# Patient Record
Sex: MALE | Race: WHITE | NOT HISPANIC OR LATINO
[De-identification: names, ages, dates, MRNs, and addresses within clinical notes are randomized per-mention and may not be internally consistent; named-entity substitution may affect disease eponyms.]

---

## 2019-02-07 VITALS
HEART RATE: 99 BPM | TEMPERATURE: 100 F | DIASTOLIC BLOOD PRESSURE: 78 MMHG | OXYGEN SATURATION: 97 % | SYSTOLIC BLOOD PRESSURE: 122 MMHG | RESPIRATION RATE: 17 BRPM

## 2019-02-07 LAB
ALBUMIN SERPL ELPH-MCNC: 3.5 G/DL — SIGNIFICANT CHANGE UP (ref 3.4–5)
ALP SERPL-CCNC: 65 U/L — SIGNIFICANT CHANGE UP (ref 40–120)
ALT FLD-CCNC: 15 U/L — SIGNIFICANT CHANGE UP (ref 12–42)
ANION GAP SERPL CALC-SCNC: 7 MMOL/L — LOW (ref 9–16)
APPEARANCE UR: CLEAR — SIGNIFICANT CHANGE UP
AST SERPL-CCNC: 17 U/L — SIGNIFICANT CHANGE UP (ref 15–37)
BASOPHILS NFR BLD AUTO: 0.2 % — SIGNIFICANT CHANGE UP (ref 0–2)
BILIRUB SERPL-MCNC: 1 MG/DL — SIGNIFICANT CHANGE UP (ref 0.2–1.2)
BILIRUB UR-MCNC: ABNORMAL
BUN SERPL-MCNC: 11 MG/DL — SIGNIFICANT CHANGE UP (ref 7–23)
CALCIUM SERPL-MCNC: 9.1 MG/DL — SIGNIFICANT CHANGE UP (ref 8.5–10.5)
CHLORIDE SERPL-SCNC: 102 MMOL/L — SIGNIFICANT CHANGE UP (ref 96–108)
CO2 SERPL-SCNC: 23 MMOL/L — SIGNIFICANT CHANGE UP (ref 22–31)
COLOR SPEC: YELLOW — SIGNIFICANT CHANGE UP
CREAT SERPL-MCNC: 1.1 MG/DL — SIGNIFICANT CHANGE UP (ref 0.5–1.3)
DIFF PNL FLD: NEGATIVE — SIGNIFICANT CHANGE UP
EOSINOPHIL NFR BLD AUTO: 0.1 % — SIGNIFICANT CHANGE UP (ref 0–6)
GLUCOSE SERPL-MCNC: 119 MG/DL — HIGH (ref 70–99)
GLUCOSE UR QL: NEGATIVE — SIGNIFICANT CHANGE UP
HCT VFR BLD CALC: 43.5 % — SIGNIFICANT CHANGE UP (ref 39–50)
HGB BLD-MCNC: 15.1 G/DL — SIGNIFICANT CHANGE UP (ref 13–17)
IMM GRANULOCYTES NFR BLD AUTO: 0.6 % — SIGNIFICANT CHANGE UP (ref 0–1.5)
KETONES UR-MCNC: >=80 MG/DL
LEUKOCYTE ESTERASE UR-ACNC: NEGATIVE — SIGNIFICANT CHANGE UP
LIDOCAIN IGE QN: 74 U/L — SIGNIFICANT CHANGE UP (ref 73–393)
LYMPHOCYTES # BLD AUTO: 7.3 % — LOW (ref 13–44)
MCHC RBC-ENTMCNC: 29.6 PG — SIGNIFICANT CHANGE UP (ref 27–34)
MCHC RBC-ENTMCNC: 34.7 G/DL — SIGNIFICANT CHANGE UP (ref 32–36)
MCV RBC AUTO: 85.3 FL — SIGNIFICANT CHANGE UP (ref 80–100)
MONOCYTES NFR BLD AUTO: 8.2 % — SIGNIFICANT CHANGE UP (ref 2–14)
NEUTROPHILS NFR BLD AUTO: 83.6 % — HIGH (ref 43–77)
NITRITE UR-MCNC: NEGATIVE — SIGNIFICANT CHANGE UP
PH UR: 6 — SIGNIFICANT CHANGE UP (ref 5–8)
PLATELET # BLD AUTO: 172 K/UL — SIGNIFICANT CHANGE UP (ref 150–400)
POTASSIUM SERPL-MCNC: 3.6 MMOL/L — SIGNIFICANT CHANGE UP (ref 3.5–5.3)
POTASSIUM SERPL-SCNC: 3.6 MMOL/L — SIGNIFICANT CHANGE UP (ref 3.5–5.3)
PROT SERPL-MCNC: 7.3 G/DL — SIGNIFICANT CHANGE UP (ref 6.4–8.2)
PROT UR-MCNC: ABNORMAL MG/DL
RBC # BLD: 5.1 M/UL — SIGNIFICANT CHANGE UP (ref 4.2–5.8)
RBC # FLD: 12.7 % — SIGNIFICANT CHANGE UP (ref 10.3–14.5)
SODIUM SERPL-SCNC: 132 MMOL/L — SIGNIFICANT CHANGE UP (ref 132–145)
SP GR SPEC: 1.02 — SIGNIFICANT CHANGE UP (ref 1–1.03)
UROBILINOGEN FLD QL: 0.2 E.U./DL — SIGNIFICANT CHANGE UP
WBC # BLD: 14.5 K/UL — HIGH (ref 3.8–10.5)
WBC # FLD AUTO: 14.5 K/UL — HIGH (ref 3.8–10.5)

## 2019-02-07 RX ORDER — ONDANSETRON 8 MG/1
4 TABLET, FILM COATED ORAL ONCE
Qty: 0 | Refills: 0 | Status: COMPLETED | OUTPATIENT
Start: 2019-02-07 | End: 2019-02-07

## 2019-02-07 RX ORDER — SODIUM CHLORIDE 9 MG/ML
1000 INJECTION INTRAMUSCULAR; INTRAVENOUS; SUBCUTANEOUS ONCE
Qty: 0 | Refills: 0 | Status: COMPLETED | OUTPATIENT
Start: 2019-02-07 | End: 2019-02-07

## 2019-02-07 RX ORDER — MORPHINE SULFATE 50 MG/1
4 CAPSULE, EXTENDED RELEASE ORAL ONCE
Qty: 0 | Refills: 0 | Status: DISCONTINUED | OUTPATIENT
Start: 2019-02-07 | End: 2019-02-07

## 2019-02-07 RX ORDER — ONDANSETRON 8 MG/1
8 TABLET, FILM COATED ORAL ONCE
Qty: 0 | Refills: 0 | Status: COMPLETED | OUTPATIENT
Start: 2019-02-07 | End: 2019-02-07

## 2019-02-07 RX ORDER — IOHEXOL 300 MG/ML
30 INJECTION, SOLUTION INTRAVENOUS ONCE
Qty: 0 | Refills: 0 | Status: COMPLETED | OUTPATIENT
Start: 2019-02-07 | End: 2019-02-07

## 2019-02-07 RX ADMIN — SODIUM CHLORIDE 2000 MILLILITER(S): 9 INJECTION INTRAMUSCULAR; INTRAVENOUS; SUBCUTANEOUS at 22:38

## 2019-02-07 RX ADMIN — IOHEXOL 30 MILLILITER(S): 300 INJECTION, SOLUTION INTRAVENOUS at 22:47

## 2019-02-07 RX ADMIN — MORPHINE SULFATE 4 MILLIGRAM(S): 50 CAPSULE, EXTENDED RELEASE ORAL at 22:47

## 2019-02-07 RX ADMIN — ONDANSETRON 4 MILLIGRAM(S): 8 TABLET, FILM COATED ORAL at 22:38

## 2019-02-07 NOTE — ED PROVIDER NOTE - OBJECTIVE STATEMENT
Patient presents with subjective fever, bodyaches and generalized abdominal pain that started this morning, pain became more intense 2 hours ago and localized to the RLQ. admits to anorexia and mild nausea. last BM today was well formed.

## 2019-02-07 NOTE — ED PROVIDER NOTE - MEDICAL DECISION MAKING DETAILS
patient with bodyaches subjective fever, generalized abdominal discomfort that started this morning associated with anorexia, with pain localizing to the RLQ x 2 hrs WBC 14K with shift, CT with acute uncomplicated appendicitis.

## 2019-02-07 NOTE — ED ADULT NURSE NOTE - OBJECTIVE STATEMENT
pt aox4. neurologically wnl. no sob or difficulty breathing noted. lung sounds clear bilaterally. skin appropriate for ethnicity, warm and dry. cap refill < 2 secs. pulses 2+ and regular. abd rounded soft and tender to palpation on RLQ. pt c/o primarily RLQ pain with nausea. denies any vomiting or diarrhea. also states he's had intermittent fever and chills.

## 2019-02-07 NOTE — ED ADULT TRIAGE NOTE - CHIEF COMPLAINT QUOTE
Pt walked into ED c.o RLQ pain since am. Pt states " I have really bad pain in my right side that radiates to my back and left side". pt rates pain as a 7/10 at this time. Pt c.o mild nausea and denes fever, chills, vomiting or diarrhea at this time.

## 2019-02-07 NOTE — ED PROVIDER NOTE - ATTENDING CONTRIBUTION TO CARE
38 y.o. male with abd pain, WBC 14K, localizing to RLQ, (+) appendicitis on CT scan, accepted for admission to [facility]/surgery.

## 2019-02-08 ENCOUNTER — RESULT REVIEW (OUTPATIENT)
Age: 39
End: 2019-02-08

## 2019-02-08 ENCOUNTER — INPATIENT (INPATIENT)
Facility: HOSPITAL | Age: 39
LOS: 0 days | Discharge: HOME | DRG: 340 | End: 2019-02-09
Attending: STUDENT IN AN ORGANIZED HEALTH CARE EDUCATION/TRAINING PROGRAM | Admitting: STUDENT IN AN ORGANIZED HEALTH CARE EDUCATION/TRAINING PROGRAM
Payer: COMMERCIAL

## 2019-02-08 ENCOUNTER — TRANSCRIPTION ENCOUNTER (OUTPATIENT)
Age: 39
End: 2019-02-08

## 2019-02-08 PROCEDURE — 99285 EMERGENCY DEPT VISIT HI MDM: CPT | Mod: 25

## 2019-02-08 PROCEDURE — 93010 ELECTROCARDIOGRAM REPORT: CPT

## 2019-02-08 PROCEDURE — 99053 MED SERV 10PM-8AM 24 HR FAC: CPT

## 2019-02-08 PROCEDURE — 74177 CT ABD & PELVIS W/CONTRAST: CPT | Mod: 26

## 2019-02-08 PROCEDURE — 99223 1ST HOSP IP/OBS HIGH 75: CPT | Mod: GC,57

## 2019-02-08 PROCEDURE — 44970 LAPAROSCOPY APPENDECTOMY: CPT | Mod: GC

## 2019-02-08 RX ORDER — HEPARIN SODIUM 5000 [USP'U]/ML
5000 INJECTION INTRAVENOUS; SUBCUTANEOUS EVERY 8 HOURS
Qty: 0 | Refills: 0 | Status: DISCONTINUED | OUTPATIENT
Start: 2019-02-08 | End: 2019-02-08

## 2019-02-08 RX ORDER — HEPARIN SODIUM 5000 [USP'U]/ML
5000 INJECTION INTRAVENOUS; SUBCUTANEOUS EVERY 8 HOURS
Qty: 0 | Refills: 0 | Status: DISCONTINUED | OUTPATIENT
Start: 2019-02-08 | End: 2019-02-09

## 2019-02-08 RX ORDER — HYDROMORPHONE HYDROCHLORIDE 2 MG/ML
1 INJECTION INTRAMUSCULAR; INTRAVENOUS; SUBCUTANEOUS EVERY 4 HOURS
Qty: 0 | Refills: 0 | Status: DISCONTINUED | OUTPATIENT
Start: 2019-02-08 | End: 2019-02-08

## 2019-02-08 RX ORDER — SODIUM CHLORIDE 9 MG/ML
1000 INJECTION, SOLUTION INTRAVENOUS
Qty: 0 | Refills: 0 | Status: DISCONTINUED | OUTPATIENT
Start: 2019-02-08 | End: 2019-02-09

## 2019-02-08 RX ORDER — ACETAMINOPHEN 500 MG
650 TABLET ORAL ONCE
Qty: 0 | Refills: 0 | Status: COMPLETED | OUTPATIENT
Start: 2019-02-08 | End: 2019-02-08

## 2019-02-08 RX ORDER — ONDANSETRON 8 MG/1
4 TABLET, FILM COATED ORAL EVERY 6 HOURS
Qty: 0 | Refills: 0 | Status: DISCONTINUED | OUTPATIENT
Start: 2019-02-08 | End: 2019-02-09

## 2019-02-08 RX ORDER — PIPERACILLIN AND TAZOBACTAM 4; .5 G/20ML; G/20ML
4.5 INJECTION, POWDER, LYOPHILIZED, FOR SOLUTION INTRAVENOUS ONCE
Qty: 0 | Refills: 0 | Status: COMPLETED | OUTPATIENT
Start: 2019-02-08 | End: 2019-02-08

## 2019-02-08 RX ORDER — HYDROMORPHONE HYDROCHLORIDE 2 MG/ML
1 INJECTION INTRAMUSCULAR; INTRAVENOUS; SUBCUTANEOUS EVERY 6 HOURS
Qty: 0 | Refills: 0 | Status: DISCONTINUED | OUTPATIENT
Start: 2019-02-08 | End: 2019-02-09

## 2019-02-08 RX ORDER — ACETAMINOPHEN 500 MG
1000 TABLET ORAL ONCE
Qty: 0 | Refills: 0 | Status: DISCONTINUED | OUTPATIENT
Start: 2019-02-08 | End: 2019-02-08

## 2019-02-08 RX ORDER — ACETAMINOPHEN 500 MG
1000 TABLET ORAL ONCE
Qty: 0 | Refills: 0 | Status: COMPLETED | OUTPATIENT
Start: 2019-02-08 | End: 2019-02-08

## 2019-02-08 RX ORDER — ONDANSETRON 8 MG/1
4 TABLET, FILM COATED ORAL EVERY 6 HOURS
Qty: 0 | Refills: 0 | Status: DISCONTINUED | OUTPATIENT
Start: 2019-02-08 | End: 2019-02-08

## 2019-02-08 RX ORDER — INFLUENZA VIRUS VACCINE 15; 15; 15; 15 UG/.5ML; UG/.5ML; UG/.5ML; UG/.5ML
0.5 SUSPENSION INTRAMUSCULAR ONCE
Qty: 0 | Refills: 0 | Status: DISCONTINUED | OUTPATIENT
Start: 2019-02-08 | End: 2019-02-09

## 2019-02-08 RX ORDER — HYDROMORPHONE HYDROCHLORIDE 2 MG/ML
0.5 INJECTION INTRAMUSCULAR; INTRAVENOUS; SUBCUTANEOUS EVERY 6 HOURS
Qty: 0 | Refills: 0 | Status: DISCONTINUED | OUTPATIENT
Start: 2019-02-08 | End: 2019-02-08

## 2019-02-08 RX ORDER — CEFOTETAN DISODIUM 1 G
2 VIAL (EA) INJECTION EVERY 12 HOURS
Qty: 0 | Refills: 0 | Status: DISCONTINUED | OUTPATIENT
Start: 2019-02-08 | End: 2019-02-08

## 2019-02-08 RX ORDER — HYDROMORPHONE HYDROCHLORIDE 2 MG/ML
0.5 INJECTION INTRAMUSCULAR; INTRAVENOUS; SUBCUTANEOUS EVERY 6 HOURS
Qty: 0 | Refills: 0 | Status: DISCONTINUED | OUTPATIENT
Start: 2019-02-08 | End: 2019-02-09

## 2019-02-08 RX ORDER — KETOROLAC TROMETHAMINE 30 MG/ML
30 SYRINGE (ML) INJECTION ONCE
Qty: 0 | Refills: 0 | Status: DISCONTINUED | OUTPATIENT
Start: 2019-02-08 | End: 2019-02-08

## 2019-02-08 RX ORDER — SODIUM CHLORIDE 9 MG/ML
1000 INJECTION, SOLUTION INTRAVENOUS
Qty: 0 | Refills: 0 | Status: DISCONTINUED | OUTPATIENT
Start: 2019-02-08 | End: 2019-02-08

## 2019-02-08 RX ORDER — HYDROMORPHONE HYDROCHLORIDE 2 MG/ML
0.5 INJECTION INTRAMUSCULAR; INTRAVENOUS; SUBCUTANEOUS EVERY 4 HOURS
Qty: 0 | Refills: 0 | Status: DISCONTINUED | OUTPATIENT
Start: 2019-02-08 | End: 2019-02-08

## 2019-02-08 RX ORDER — CEFOTETAN DISODIUM 1 G
2 VIAL (EA) INJECTION EVERY 12 HOURS
Qty: 0 | Refills: 0 | Status: DISCONTINUED | OUTPATIENT
Start: 2019-02-08 | End: 2019-02-09

## 2019-02-08 RX ADMIN — Medication 650 MILLIGRAM(S): at 02:33

## 2019-02-08 RX ADMIN — Medication 30 MILLIGRAM(S): at 00:32

## 2019-02-08 RX ADMIN — HYDROMORPHONE HYDROCHLORIDE 0.5 MILLIGRAM(S): 2 INJECTION INTRAMUSCULAR; INTRAVENOUS; SUBCUTANEOUS at 22:05

## 2019-02-08 RX ADMIN — HEPARIN SODIUM 5000 UNIT(S): 5000 INJECTION INTRAVENOUS; SUBCUTANEOUS at 07:42

## 2019-02-08 RX ADMIN — PIPERACILLIN AND TAZOBACTAM 200 GRAM(S): 4; .5 INJECTION, POWDER, LYOPHILIZED, FOR SOLUTION INTRAVENOUS at 00:31

## 2019-02-08 RX ADMIN — Medication 400 MILLIGRAM(S): at 22:49

## 2019-02-08 RX ADMIN — Medication 100 GRAM(S): at 08:12

## 2019-02-08 RX ADMIN — HEPARIN SODIUM 5000 UNIT(S): 5000 INJECTION INTRAVENOUS; SUBCUTANEOUS at 22:04

## 2019-02-08 RX ADMIN — Medication 1000 MILLIGRAM(S): at 23:11

## 2019-02-08 RX ADMIN — ONDANSETRON 4 MILLIGRAM(S): 8 TABLET, FILM COATED ORAL at 22:04

## 2019-02-08 RX ADMIN — SODIUM CHLORIDE 120 MILLILITER(S): 9 INJECTION, SOLUTION INTRAVENOUS at 22:04

## 2019-02-08 RX ADMIN — Medication 400 MILLIGRAM(S): at 07:57

## 2019-02-08 RX ADMIN — HYDROMORPHONE HYDROCHLORIDE 0.5 MILLIGRAM(S): 2 INJECTION INTRAMUSCULAR; INTRAVENOUS; SUBCUTANEOUS at 21:30

## 2019-02-08 NOTE — DISCHARGE NOTE ADULT - PATIENT PORTAL LINK FT
You can access the [facility] Patient Portal, offered by [facility], by registering with the following website: [email or web address]

## 2019-02-08 NOTE — BRIEF OPERATIVE NOTE - PROCEDURE
<<-----Click on this checkbox to enter Procedure Appendectomy, laparoscopic  02/08/2019    Active  CCRIPPS

## 2019-02-08 NOTE — DISCHARGE NOTE ADULT - CARE PLAN
Principal Discharge DX:	Appendicitis  Goal:	recovery  Assessment and plan of treatment:	Follow up with Dr. Fontanez in 1-2 weeks. Call the office at [phone] to schedule your appointment. You may shower; soap and water over incision sites. Do not scrub. Pat dry when done. Keep incision sites out of the sun as scars will darken. Ambulate as tolerated, but no heavy lifting (>10lbs) or strenuous exercise. You may resume regular diet. You should be urinating at least 3-4x per day. Call the office if you experience increasing pain, nausea, vomiting, swelling, redness, or drainage from incision site, temperature >101.4F Principal Discharge DX:	Appendicitis  Goal:	recovery  Assessment and plan of treatment:	Follow up with Dr. Fontanez in 1-2 weeks. Call the office at [phone] to schedule your appointment. You may shower; soap and water over incision sites. Do not scrub. Pat dry when done. Keep incision sites out of the sun as scars will darken. Ambulate as tolerated, but no heavy lifting (>10lbs) or strenuous exercise. You may resume regular diet. You should be urinating at least 3-4x per day. Call the office if you experience increasing pain, nausea, vomiting, swelling, redness, or drainage from incision site, temperature >101.4F  Goal:	Antibiotics  Assessment and plan of treatment:	complete course of antibiotics (augmentin) 1 week as prescribed. take all pills

## 2019-02-08 NOTE — DISCHARGE NOTE ADULT - MEDICATION SUMMARY - MEDICATIONS TO TAKE
I will START or STAY ON the medications listed below when I get home from the hospital:    Percocet 5/325 oral tablet  -- 2 tab(s) by mouth every 6 hours x 3 days, As Needed  -for severe pain MDD:8   -- Caution federal law prohibits the transfer of this drug to any person other  than the person for whom it was prescribed.  May cause drowsiness.  Alcohol may intensify this effect.  Use care when operating dangerous machinery.  This prescription cannot be refilled.  This product contains acetaminophen.  Do not use  with any other product containing acetaminophen to prevent possible liver damage.  Using more of this medication than prescribed may cause serious breathing problems.    -- Indication: For Pain    Augmentin 875 mg-125 mg oral tablet  -- 1 tab(s) by mouth every 12 hours for 1 week  -- Finish all this medication unless otherwise directed by prescriber.  Take with food or milk.    -- Indication: For Antibiotic

## 2019-02-08 NOTE — ED ADULT NURSE REASSESSMENT NOTE - NS ED NURSE REASSESS COMMENT FT1
Pt resting in bed. Pain medication administered. IV Antibiotics infusing. Pending CT scan. Will continue to monitor.

## 2019-02-08 NOTE — DISCHARGE NOTE ADULT - CARE PROVIDER_API CALL
Luz Marina Fontanez)  Surgery  [address] [address]  Phone: [phone]  Fax: [phone]  Follow Up Time:

## 2019-02-08 NOTE — PROGRESS NOTE ADULT - SUBJECTIVE AND OBJECTIVE BOX
[facility] Surgery Post-Op Note    Procedure: Appendectomy, laparoscopic      Subjective: Patient denies nausea and endorses mild incisional pain    Vital Signs Last 24 Hrs  T(C): 36.6 (2019 14:00), Max: 38.7 (2019 07:24)  T(F): 97.9 (2019 14:00), Max: 101.7 (2019 07:24)  HR: 74 (2019 15:00) (48 - 99)  BP: 122/59 (2019 15:00) (121/59 - 153/64)  BP(mean): 79 (2019 15:00) (79 - 89)  RR: 16 (2019 15:00) (12 - 18)  SpO2: 97% (2019 15:00) (97% - 100%)    Physical Exam:  General: NAD, resting comfortably in bed  Pulmonary: Nonlabored breathing, no respiratory distress  Cardiovascular: No heaves or thrills  Abdominal: Soft, nondistended, appropriately tender, incisions CDI  Extremities: WWP, no cyanosis  Neuro: A/O x 3      LABS:                        15.1   14.5  )-----------( 172      ( 2019 22:47 )             43.5     02-    132  |  102  |  11  ----------------------------<  119<H>  3.6   |  23  |  1.10    Ca    9.1      2019 22:47    TPro  7.3  /  Alb  3.5  /  TBili  1.0  /  DBili  x   /  AST  17  /  ALT  15  /  AlkPhos  65  02-07      CAPILLARY BLOOD GLUCOSE        Urinalysis Basic - ( 2019 23:48 )    Color: Yellow / Appearance: Clear / S.020 / pH: x  Gluc: x / Ketone: >=80 mg/dL  / Bili: Small / Urobili: 0.2 E.U./dL   Blood: x / Protein: Trace mg/dL / Nitrite: NEGATIVE   Leuk Esterase: NEGATIVE / RBC: < 5 /HPF / WBC < 5 /HPF   Sq Epi: x / Non Sq Epi: x / Bacteria: Present /HPF      LIVER FUNCTIONS - ( 2019 22:47 )  Alb: 3.5 g/dL / Pro: 7.3 g/dL / ALK PHOS: 65 U/L / ALT: 15 U/L / AST: 17 U/L / GGT: x           ABO Interpretation: O ( @ 07:52)          Assessment:38y Male s/p above procedure    Plan:  Pain/nausea control IV PRN  NPO  Cefotetan  Incentive spirometer/OOB/Ambulate  IVF  AM labs

## 2019-02-08 NOTE — H&P ADULT - NSHPPHYSICALEXAM_GEN_ALL_CORE
Neuro: Alert and Oriented X 4  Respiratory: CTA b/l. no respiratory distress  Cardiovascular: NSR, RRR  Gastrointestinal: Soft, ND, ttp on RLQ, + Rovsing. No rebound or guarding.  Extremities: (-) edema b/l

## 2019-02-08 NOTE — H&P ADULT - HISTORY OF PRESENT ILLNESS
37 y/o M no PMH/PSH presents from [facility] complaining of severe sharp abdominal pain in the right lower side for 1 day. He states pain started on sides of abdomen and periumbilical around 4 am yesterday and migrated to right lower side in the evening and increased in intensity. Pt admits to subjective fevers and chills. Admits to anorexia. Pt denies crohns and UC, denies colonoscopy. Pt denies N/V/C/D,CP/SOB, dysuria.

## 2019-02-08 NOTE — H&P ADULT - NSHPLABSRESULTS_GEN_ALL_CORE
CBC Full  -  ( 2019 22:47 )  WBC Count : 14.5 K/uL  Hemoglobin : 15.1 g/dL  Hematocrit : 43.5 %  Platelet Count - Automated : 172 K/uL  Mean Cell Volume : 85.3 fL  Mean Cell Hemoglobin : 29.6 pg  Mean Cell Hemoglobin Concentration : 34.7 g/dL  Auto Neutrophil # : x  Auto Lymphocyte # : x  Auto Monocyte # : x  Auto Eosinophil # : x  Auto Basophil # : x  Auto Neutrophil % : 83.6 %  Auto Lymphocyte % : 7.3 %  Auto Monocyte % : 8.2 %  Auto Eosinophil % : 0.1 %  Auto Basophil % : 0.2 %        132  |  102  |  11  ----------------------------<  119<H>  3.6   |  23  |  1.10    Ca    9.1      2019 22:47    TPro  7.3  /  Alb  3.5  /  TBili  1.0  /  DBili  x   /  AST  17  /  ALT  15  /  AlkPhos  65  02-07    LIVER FUNCTIONS - ( 2019 22:47 )  Alb: 3.5 g/dL / Pro: 7.3 g/dL / ALK PHOS: 65 U/L / ALT: 15 U/L / AST: 17 U/L / GGT: x           CAPILLARY BLOOD GLUCOSE        Urinalysis Basic - ( 2019 23:48 )    Color: Yellow / Appearance: Clear / S.020 / pH: x  Gluc: x / Ketone: >=80 mg/dL  / Bili: Small / Urobili: 0.2 E.U./dL   Blood: x / Protein: Trace mg/dL / Nitrite: NEGATIVE   Leuk Esterase: NEGATIVE / RBC: < 5 /HPF / WBC < 5 /HPF   Sq Epi: x / Non Sq Epi: x / Bacteria: Present /HPF

## 2019-02-08 NOTE — H&P ADULT - ASSESSMENT
39 y/o M presents w/ acute appendicitis     Admit to general Sx under Dr. Worthy  NPO for lap appy today  IVF  pain nausea control prn  DVT ppx  Cefotetan   Plans discussed chief and Dr. Worthy

## 2019-02-08 NOTE — PROGRESS NOTE ADULT - ASSESSMENT
A/P: 37 y/o M presents w/ acute appendicitis, pending laparoscopic appendectomy today    NPO for lap appy today  IVF  pain nausea control prn  DVT ppx  cefotetan

## 2019-02-08 NOTE — BRIEF OPERATIVE NOTE - OPERATION/FINDINGS
Acutely inflamed, gangrenous appendix with surrounding fibrinous exudate and free fluid in the pelvis. Mesoappendix ligated with Ligasure. Appendix ligated and transected with EndoGIA purple x 1.

## 2019-02-08 NOTE — DISCHARGE NOTE ADULT - HOSPITAL COURSE
37 y/o M presents w/ acute appendicitis s/p lap appendectomy (locally perforated) s/p laparoscopic appendectomy with acutely inflamed, gangrenous appendix with surrounding fibrinous exudate and free fluid in the pelvis. Patient was placed of cefotetan post-op. Patient's post-operative course was uncomplicated. Diet was advanced as tolerated and pain was well controlled on medication. On day of discharge, pt deemed stable and ready to return home with plan to follow up as an outpatient. 37 y/o M presents w/ acute appendicitis s/p lap appendectomy (locally perforated) s/p laparoscopic appendectomy with acutely inflamed, gangrenous appendix with surrounding fibrinous exudate and free fluid in the pelvis. Patient was placed of cefotetan post-op. Patient's post-operative course was uncomplicated. Diet was advanced as tolerated and pain was well controlled on medication. On day of discharge, pt deemed stable and ready to return home with plan to follow up as an outpatient with 1 week of augmentin

## 2019-02-08 NOTE — DISCHARGE NOTE ADULT - PLAN OF CARE
recovery Follow up with Dr. Fontanez in 1-2 weeks. Call the office at [phone] to schedule your appointment. You may shower; soap and water over incision sites. Do not scrub. Pat dry when done. Keep incision sites out of the sun as scars will darken. Ambulate as tolerated, but no heavy lifting (>10lbs) or strenuous exercise. You may resume regular diet. You should be urinating at least 3-4x per day. Call the office if you experience increasing pain, nausea, vomiting, swelling, redness, or drainage from incision site, temperature >101.4F Antibiotics complete course of antibiotics (augmentin) 1 week as prescribed. take all pills

## 2019-02-08 NOTE — PROGRESS NOTE ADULT - SUBJECTIVE AND OBJECTIVE BOX
ID: 39 y/o M presents w/ acute appendicitis    SUBJECTIVE: Patient with severe abdominal pain and fever      OBJECTIVE:    Vital Signs:  Vital Signs Last 24 Hrs  T(C): 37 (2019 08:39), Max: 38.7 (2019 07:24)  T(F): 98.6 (2019 08:39), Max: 101.7 (2019 07:24)  HR: 48 (2019 08:39) (48 - 99)  BP: 121/68 (2019 08:39) (121/68 - 137/73)  BP(mean): --  RR: 17 (2019 08:39) (17 - 18)  SpO2: 97% (2019 08:39) (97% - 100%)    Physical Exam:  General: Uncomfortable, diaphoretic  Pulmonary: Nonlabored breathing, no respiratory distress  Cardiovascular: No heaves/thrills  Abdominal: Soft, distended, diffuse abdominal tenderness, RLQ tenderness to light palpation  Extremities: WWP, no clubbing/cyanosis/edema  Neuro: AAO x3, no focal deficits    Lines/Drains/Tubes:    Ins and Outs:  I&O's Summary      Labs:                        15.1   14.5  )-----------( 172      ( 2019 22:47 )             43.5     02-    132  |  102  |  11  ----------------------------<  119<H>  3.6   |  23  |  1.10    Ca    9.1      2019 22:47    TPro  7.3  /  Alb  3.5  /  TBili  1.0  /  DBili  x   /  AST  17  /  ALT  15  /  AlkPhos  65  02-07      Urinalysis Basic - ( 2019 23:48 )    Color: Yellow / Appearance: Clear / S.020 / pH: x  Gluc: x / Ketone: >=80 mg/dL  / Bili: Small / Urobili: 0.2 E.U./dL   Blood: x / Protein: Trace mg/dL / Nitrite: NEGATIVE   Leuk Esterase: NEGATIVE / RBC: < 5 /HPF / WBC < 5 /HPF   Sq Epi: x / Non Sq Epi: x / Bacteria: Present /HPF      CAPILLARY BLOOD GLUCOSE        LIVER FUNCTIONS - ( 2019 22:47 )  Alb: 3.5 g/dL / Pro: 7.3 g/dL / ALK PHOS: 65 U/L / ALT: 15 U/L / AST: 17 U/L / GGT: x             Radiology & Additional Studies:

## 2019-02-09 VITALS
DIASTOLIC BLOOD PRESSURE: 78 MMHG | HEART RATE: 71 BPM | SYSTOLIC BLOOD PRESSURE: 137 MMHG | TEMPERATURE: 98 F | OXYGEN SATURATION: 99 % | RESPIRATION RATE: 17 BRPM

## 2019-02-09 LAB
ANION GAP SERPL CALC-SCNC: 11 MMOL/L — SIGNIFICANT CHANGE UP (ref 5–17)
BASOPHILS # BLD AUTO: 0.02 K/UL — SIGNIFICANT CHANGE UP (ref 0–0.2)
BASOPHILS NFR BLD AUTO: 0.2 % — SIGNIFICANT CHANGE UP (ref 0–2)
BUN SERPL-MCNC: 8 MG/DL — SIGNIFICANT CHANGE UP (ref 7–23)
CALCIUM SERPL-MCNC: 8.9 MG/DL — SIGNIFICANT CHANGE UP (ref 8.4–10.5)
CHLORIDE SERPL-SCNC: 101 MMOL/L — SIGNIFICANT CHANGE UP (ref 96–108)
CO2 SERPL-SCNC: 25 MMOL/L — SIGNIFICANT CHANGE UP (ref 22–31)
CREAT SERPL-MCNC: 0.8 MG/DL — SIGNIFICANT CHANGE UP (ref 0.5–1.3)
EOSINOPHIL # BLD AUTO: 0.02 K/UL — SIGNIFICANT CHANGE UP (ref 0–0.5)
EOSINOPHIL NFR BLD AUTO: 0.2 % — SIGNIFICANT CHANGE UP (ref 0–6)
GLUCOSE SERPL-MCNC: 109 MG/DL — HIGH (ref 70–99)
HCT VFR BLD CALC: 41.2 % — SIGNIFICANT CHANGE UP (ref 39–50)
HGB BLD-MCNC: 14 G/DL — SIGNIFICANT CHANGE UP (ref 13–17)
IMM GRANULOCYTES NFR BLD AUTO: 0.6 % — SIGNIFICANT CHANGE UP (ref 0–1.5)
LYMPHOCYTES # BLD AUTO: 1.44 K/UL — SIGNIFICANT CHANGE UP (ref 1–3.3)
LYMPHOCYTES # BLD AUTO: 12.2 % — LOW (ref 13–44)
MAGNESIUM SERPL-MCNC: 2 MG/DL — SIGNIFICANT CHANGE UP (ref 1.6–2.6)
MCHC RBC-ENTMCNC: 30.2 PG — SIGNIFICANT CHANGE UP (ref 27–34)
MCHC RBC-ENTMCNC: 34 GM/DL — SIGNIFICANT CHANGE UP (ref 32–36)
MCV RBC AUTO: 88.8 FL — SIGNIFICANT CHANGE UP (ref 80–100)
MONOCYTES # BLD AUTO: 0.67 K/UL — SIGNIFICANT CHANGE UP (ref 0–0.9)
MONOCYTES NFR BLD AUTO: 5.7 % — SIGNIFICANT CHANGE UP (ref 2–14)
NEUTROPHILS # BLD AUTO: 9.54 K/UL — HIGH (ref 1.8–7.4)
NEUTROPHILS NFR BLD AUTO: 81.1 % — HIGH (ref 43–77)
PHOSPHATE SERPL-MCNC: 3 MG/DL — SIGNIFICANT CHANGE UP (ref 2.5–4.5)
PLATELET # BLD AUTO: 155 K/UL — SIGNIFICANT CHANGE UP (ref 150–400)
POTASSIUM SERPL-MCNC: 4.2 MMOL/L — SIGNIFICANT CHANGE UP (ref 3.5–5.3)
POTASSIUM SERPL-SCNC: 4.2 MMOL/L — SIGNIFICANT CHANGE UP (ref 3.5–5.3)
RBC # BLD: 4.64 M/UL — SIGNIFICANT CHANGE UP (ref 4.2–5.8)
RBC # FLD: 12.4 % — SIGNIFICANT CHANGE UP (ref 10.3–14.5)
SODIUM SERPL-SCNC: 137 MMOL/L — SIGNIFICANT CHANGE UP (ref 135–145)
WBC # BLD: 11.76 K/UL — HIGH (ref 3.8–10.5)
WBC # FLD AUTO: 11.76 K/UL — HIGH (ref 3.8–10.5)

## 2019-02-09 RX ADMIN — HYDROMORPHONE HYDROCHLORIDE 1 MILLIGRAM(S): 2 INJECTION INTRAMUSCULAR; INTRAVENOUS; SUBCUTANEOUS at 07:05

## 2019-02-09 RX ADMIN — HYDROMORPHONE HYDROCHLORIDE 0.5 MILLIGRAM(S): 2 INJECTION INTRAMUSCULAR; INTRAVENOUS; SUBCUTANEOUS at 04:00

## 2019-02-09 RX ADMIN — Medication 100 GRAM(S): at 23:30

## 2019-02-09 RX ADMIN — HEPARIN SODIUM 5000 UNIT(S): 5000 INJECTION INTRAVENOUS; SUBCUTANEOUS at 06:04

## 2019-02-09 RX ADMIN — Medication 100 GRAM(S): at 11:45

## 2019-02-09 RX ADMIN — ONDANSETRON 4 MILLIGRAM(S): 8 TABLET, FILM COATED ORAL at 06:04

## 2019-02-09 RX ADMIN — HYDROMORPHONE HYDROCHLORIDE 0.5 MILLIGRAM(S): 2 INJECTION INTRAMUSCULAR; INTRAVENOUS; SUBCUTANEOUS at 03:23

## 2019-02-09 RX ADMIN — HYDROMORPHONE HYDROCHLORIDE 1 MILLIGRAM(S): 2 INJECTION INTRAMUSCULAR; INTRAVENOUS; SUBCUTANEOUS at 06:30

## 2019-02-09 NOTE — PROGRESS NOTE ADULT - ASSESSMENT
38M w/ acute appendicitis s/p laparoscopic appendectomy (2/8)    pain/nausea control  IS/OOB  ADAT to regular, IVF  cefotetan  SCDs, HSQ

## 2019-02-11 PROBLEM — Z00.00 ENCOUNTER FOR PREVENTIVE HEALTH EXAMINATION: Status: ACTIVE | Noted: 2019-02-11

## 2019-02-11 PROCEDURE — 36415 COLL VENOUS BLD VENIPUNCTURE: CPT

## 2019-02-11 PROCEDURE — 83735 ASSAY OF MAGNESIUM: CPT

## 2019-02-11 PROCEDURE — 80048 BASIC METABOLIC PNL TOTAL CA: CPT

## 2019-02-11 PROCEDURE — 80053 COMPREHEN METABOLIC PANEL: CPT

## 2019-02-11 PROCEDURE — 86901 BLOOD TYPING SEROLOGIC RH(D): CPT

## 2019-02-11 PROCEDURE — 93005 ELECTROCARDIOGRAM TRACING: CPT

## 2019-02-11 PROCEDURE — 81001 URINALYSIS AUTO W/SCOPE: CPT

## 2019-02-11 PROCEDURE — 96375 TX/PRO/DX INJ NEW DRUG ADDON: CPT

## 2019-02-11 PROCEDURE — 83690 ASSAY OF LIPASE: CPT

## 2019-02-11 PROCEDURE — 99285 EMERGENCY DEPT VISIT HI MDM: CPT | Mod: 25

## 2019-02-11 PROCEDURE — 96374 THER/PROPH/DIAG INJ IV PUSH: CPT | Mod: XU

## 2019-02-11 PROCEDURE — 86850 RBC ANTIBODY SCREEN: CPT

## 2019-02-11 PROCEDURE — 88304 TISSUE EXAM BY PATHOLOGIST: CPT

## 2019-02-11 PROCEDURE — 84100 ASSAY OF PHOSPHORUS: CPT

## 2019-02-11 PROCEDURE — 86900 BLOOD TYPING SEROLOGIC ABO: CPT

## 2019-02-11 PROCEDURE — 85025 COMPLETE CBC W/AUTO DIFF WBC: CPT

## 2019-02-11 PROCEDURE — 74177 CT ABD & PELVIS W/CONTRAST: CPT

## 2019-02-14 DIAGNOSIS — K35.80 UNSPECIFIED ACUTE APPENDICITIS: ICD-10-CM

## 2019-02-14 DIAGNOSIS — K35.32 ACUTE APPENDICITIS WITH PERFORATION, LOCALIZED PERITONITIS, AND GANGRENE, WITHOUT ABSCESS: ICD-10-CM

## 2019-02-15 LAB — SURGICAL PATHOLOGY STUDY: SIGNIFICANT CHANGE UP

## 2019-02-25 ENCOUNTER — APPOINTMENT (OUTPATIENT)
Dept: SURGERY | Facility: CLINIC | Age: 39
End: 2019-02-25
Payer: OTHER GOVERNMENT

## 2019-02-25 VITALS
BODY MASS INDEX: 23.63 KG/M2 | HEIGHT: 71.5 IN | WEIGHT: 172.56 LBS | OXYGEN SATURATION: 98 % | SYSTOLIC BLOOD PRESSURE: 138 MMHG | HEART RATE: 81 BPM | TEMPERATURE: 97.9 F | DIASTOLIC BLOOD PRESSURE: 88 MMHG

## 2019-02-25 DIAGNOSIS — Z81.1 FAMILY HISTORY OF ALCOHOL ABUSE AND DEPENDENCE: ICD-10-CM

## 2019-02-25 DIAGNOSIS — K37 UNSPECIFIED APPENDICITIS: ICD-10-CM

## 2019-02-25 DIAGNOSIS — Z78.9 OTHER SPECIFIED HEALTH STATUS: ICD-10-CM

## 2019-02-25 DIAGNOSIS — K35.33 ACUTE APPENDICITIS W/ PERFORATION AND LOCALIZED PERITONITIS, W/ABSCESS: ICD-10-CM

## 2019-02-25 PROCEDURE — 99024 POSTOP FOLLOW-UP VISIT: CPT

## 2019-02-25 RX ORDER — CETIRIZINE HYDROCHLORIDE 10 MG/1
10 TABLET, FILM COATED ORAL
Refills: 0 | Status: ACTIVE | COMMUNITY

## 2019-02-25 NOTE — HISTORY OF PRESENT ILLNESS
[de-identified] : 29 yo M s/p laparoscopic appendectomy on 2/8 for perforated appendicitis with abscess.  Pathology showing Perforated acute appendicitis and periappendiceal abscess\par formation. [de-identified] : Patient feels well since surgery.  Tolerating a regular diet, having regular bowel function and has resumed activities of daily living.  Denies fevers, chills, CP/SOB

## 2019-11-26 NOTE — ED ADULT NURSE NOTE - TEMPLATE
2019  EMPLOYEE INFORMATION: EMPLOYER INFORMATION:   NAME: Lyle Sethi [employer] ( [employer] SITES)   : 1961 [phone]   DATE OF INJURY/EVENT: 2019           [facility]   Treating Provider: Emmanuel Roche PA-C  Time In:  7:52 AM Time Out:  8:22 AM      DIAGNOSIS:   1. Laceration of right hand without foreign body, initial encounter      STATUS: This injury is determined to be WORK RELATED.    RETURN TO WORK:   Employee may return to work without restrictions.  Employee is discharged from care.   Return Date: 2019            RESTRICTIONS:   Restrictions are to be followed at work and at home.  Restrictions are in effect until next follow-up visit.  No Restrictions    Instructions:  Follow-up is as needed.    Thank you for the privilege of providing medical care for this injury/condition.  If there are any questions, please call the occupational health clinic at Dept: [phone].Electronically signed on 2019 at 8:22 AM by:   Emmanuel Roche PA-C   [facility]  On 2019, Radha CORONA LPN scribed the services personally performed by Emmanuel Roche PA-C The physician below agrees with the restrictions placed on the patient by the provider above.  Wang Rodriguez, DO        
Abdominal Pain, N/V/D

## 2020-06-03 NOTE — PATIENT PROFILE ADULT - NSPROPTRIGHTCAREGIVER_GEN_A_NUR
What Is The Reason For Today's Visit?: Full Body Skin Examination with No Concerns What Is The Reason For Today's Visit? (Being Monitored For X): surveillance against the recurrence of atypical nevi How Severe Are Your Spot(S)?: mild declines

## 2020-11-30 NOTE — ED ADULT NURSE NOTE - NSIMPLEMENTINTERV_GEN_ALL_ED
Implemented All Universal Safety Interventions:  [place] to call system. Call bell, personal items and telephone within reach. Instruct patient to call for assistance. Room bathroom lighting operational. Non-slip footwear when patient is off stretcher. Physically safe environment: no spills, clutter or unnecessary equipment. Stretcher in lowest position, wheels locked, appropriate side rails in place.
no

## 2023-04-05 NOTE — PRE-OP CHECKLIST - ASSESSMENT, HISTORY & PHYSICAL COMPLETED AND ON MEDICAL RECORD
done
done
Melolabial Transposition Flap Text: The defect edges were debeveled with a #15 scalpel blade. Given the location of the defect and the proximity to free margins a melolabial flap was deemed most appropriate. Using a sterile surgical marker, an appropriate melolabial transposition flap was drawn incorporating the defect. The area thus outlined was incised deep to adipose tissue with a #15 scalpel blade. The skin margins were undermined to an appropriate distance in all directions utilizing iris scissors. Following this, the designed flap was carried over into the primary defect and sutured into place.